# Patient Record
(demographics unavailable — no encounter records)

---

## 2024-12-19 NOTE — HISTORY OF PRESENT ILLNESS
[Mother] : mother [Fruit] : fruit [Vegetables] : vegetables [Meat] : meat [Grains] : grains [Eggs] : eggs [Fish] : fish [Dairy] : dairy [Normal] : Normal [In own bed] : In own bed [Brushing teeth] : Brushing teeth [Yes] : Patient goes to dentist yearly [Vitamin] : Primary Fluoride Source: Vitamin [Playtime (60 min/d)] : Playtime 60 min a day [< 2 hrs of screen time] : Less than 2 hrs of screen time [Appropiate parent-child-sibling interaction] : Appropriate parent-child-sibling interaction [Child Cooperates] : Child cooperates [Parent has appropriate responses to behavior] : Parent has appropriate responses to behavior [Grade ___] : Grade [unfilled] [No difficulties with Homework] : No difficulties with homework [Adequate performance] : Adequate performance [Adequate attention] : Adequate attention [No] : Not at  exposure [Water heater temperature set at <120 degrees F] : Water heater temperature set at <120 degrees F [Car seat in back seat] : Car seat in back seat [Carbon Monoxide Detectors] : Carbon monoxide detectors [Smoke Detectors] : Smoke detectors [Supervised outdoor play] : Supervised outdoor play [Up to date] : Up to date [NO] : No [___ stools per day] : [unfilled]  stools per day [Exposure to electronic nicotine delivery system] : No exposure to electronic nicotine delivery system [de-identified] : Little picky [FreeTextEntry3] : Sleeps through the night  [FreeTextEntry9] : Soccer- 2 seasons/Baseball [de-identified] : [employer] [place]

## 2024-12-19 NOTE — PHYSICAL EXAM
[Alert] : alert [No Acute Distress] : no acute distress [Normocephalic] : normocephalic [Conjunctivae with no discharge] : conjunctivae with no discharge [PERRL] : PERRL [EOMI Bilateral] : EOMI bilateral [Auricles Well Formed] : auricles well formed [Clear Tympanic membranes with present light reflex and bony landmarks] : clear tympanic membranes with present light reflex and bony landmarks [No Discharge] : no discharge [Nares Patent] : nares patent [Pink Nasal Mucosa] : pink nasal mucosa [Palate Intact] : palate intact [Nonerythematous Oropharynx] : nonerythematous oropharynx [Supple, full passive range of motion] : supple, full passive range of motion [No Palpable Masses] : no palpable masses [Symmetric Chest Rise] : symmetric chest rise [Clear to Auscultation Bilaterally] : clear to auscultation bilaterally [Regular Rate and Rhythm] : regular rate and rhythm [Normal S1, S2 present] : normal S1, S2 present [No Murmurs] : no murmurs [+2 Femoral Pulses] : +2 femoral pulses [Soft] : soft [NonTender] : non tender [Non Distended] : non distended [Normoactive Bowel Sounds] : normoactive bowel sounds [No Hepatomegaly] : no hepatomegaly [No Splenomegaly] : no splenomegaly [Eric: _____] : Eric [unfilled] [Uncircumcised] : uncircumcised [Testicles Descended Bilaterally] : testicles descended bilaterally [Patent] : patent [No fissures] : no fissures [No Abnormal Lymph Nodes Palpated] : no abnormal lymph nodes palpated [No Gait Asymmetry] : no gait asymmetry [No pain or deformities with palpation of bone, muscles, joints] : no pain or deformities with palpation of bone, muscles, joints [Normal Muscle Tone] : normal muscle tone [Straight] : straight [+2 Patella DTR] : +2 patella DTR [Cranial Nerves Grossly Intact] : cranial nerves grossly intact [No Rash or Lesions] : no rash or lesions

## 2024-12-19 NOTE — DISCUSSION/SUMMARY
[Normal Growth] : growth [Normal Development] : development [None] : No known medical problems [No Elimination Concerns] : elimination [No Feeding Concerns] : feeding [No Skin Concerns] : skin [Normal Sleep Pattern] : sleep [School Readiness] : school readiness [Mental Health] : mental health [Nutrition and Physical Activity] : nutrition and physical activity [Oral Health] : oral health [Safety] : safety [No Medications] : ~He/She~ is not on any medications [Patient] : patient [Full Activity without restrictions including Physical Education & Athletics] : Full Activity without restrictions including Physical Education & Athletics [I have examined the above-named student and completed the preparticipation physical evaluation. The athlete does not present apparent clinical contraindications to practice and participate in sport(s) as outlined above. A copy of the physical exam is on r] : I have examined the above-named student and completed the preparticipation physical evaluation. The athlete does not present apparent clinical contraindications to practice and participate in sport(s) as outlined above. A copy of the physical exam is on record in my office and can be made available to the school at the request of the parents. If conditions arise after the athlete has been cleared for participation, the physician may rescind the clearance until the problem is resolved and the potential consequences are completely explained to the athlete (and parents/guardians). [FreeTextEntry1] : 5 y/o M - Doing well Normal Exam Go Check vision screening- passed- d/w mother. Flu discussed/advised- mother does not want at this time. Form for blood work given. I recommended that the patient participates in 60 minutes or more of physical activity a day.  As a school-aged child, most physical activity will be unstructured; outdoor play is particularly helpful. Encouraged physical activity with playground time in addition to discouraging sedentary time (television use). Encouraged parents to consider physical activity levels when they make choices among options for  and after-school programs.   Next CP in 1 year.

## 2024-12-19 NOTE — DISCUSSION/SUMMARY
[Normal Growth] : growth [Normal Development] : development [None] : No known medical problems [No Elimination Concerns] : elimination [No Feeding Concerns] : feeding [No Skin Concerns] : skin [Normal Sleep Pattern] : sleep [School Readiness] : school readiness [Mental Health] : mental health [Nutrition and Physical Activity] : nutrition and physical activity [Oral Health] : oral health [Safety] : safety [No Medications] : ~He/She~ is not on any medications [Patient] : patient [Full Activity without restrictions including Physical Education & Athletics] : Full Activity without restrictions including Physical Education & Athletics [I have examined the above-named student and completed the preparticipation physical evaluation. The athlete does not present apparent clinical contraindications to practice and participate in sport(s) as outlined above. A copy of the physical exam is on r] : I have examined the above-named student and completed the preparticipation physical evaluation. The athlete does not present apparent clinical contraindications to practice and participate in sport(s) as outlined above. A copy of the physical exam is on record in my office and can be made available to the school at the request of the parents. If conditions arise after the athlete has been cleared for participation, the physician may rescind the clearance until the problem is resolved and the potential consequences are completely explained to the athlete (and parents/guardians). [FreeTextEntry1] : 7 y/o M - Doing well Normal Exam Go Check vision screening- passed- d/w mother. Flu discussed/advised- mother does not want at this time. Form for blood work given. I recommended that the patient participates in 60 minutes or more of physical activity a day.  As a school-aged child, most physical activity will be unstructured; outdoor play is particularly helpful. Encouraged physical activity with playground time in addition to discouraging sedentary time (television use). Encouraged parents to consider physical activity levels when they make choices among options for  and after-school programs.   Next CP in 1 year.

## 2024-12-19 NOTE — HISTORY OF PRESENT ILLNESS
[Mother] : mother [Fruit] : fruit [Vegetables] : vegetables [Meat] : meat [Grains] : grains [Eggs] : eggs [Fish] : fish [Dairy] : dairy [Normal] : Normal [In own bed] : In own bed [Brushing teeth] : Brushing teeth [Yes] : Patient goes to dentist yearly [Vitamin] : Primary Fluoride Source: Vitamin [Playtime (60 min/d)] : Playtime 60 min a day [< 2 hrs of screen time] : Less than 2 hrs of screen time [Appropiate parent-child-sibling interaction] : Appropriate parent-child-sibling interaction [Child Cooperates] : Child cooperates [Parent has appropriate responses to behavior] : Parent has appropriate responses to behavior [Grade ___] : Grade [unfilled] [No difficulties with Homework] : No difficulties with homework [Adequate performance] : Adequate performance [Adequate attention] : Adequate attention [No] : Not at  exposure [Water heater temperature set at <120 degrees F] : Water heater temperature set at <120 degrees F [Car seat in back seat] : Car seat in back seat [Carbon Monoxide Detectors] : Carbon monoxide detectors [Smoke Detectors] : Smoke detectors [Supervised outdoor play] : Supervised outdoor play [Up to date] : Up to date [NO] : No [___ stools per day] : [unfilled]  stools per day [Exposure to electronic nicotine delivery system] : No exposure to electronic nicotine delivery system [de-identified] : Little picky [FreeTextEntry3] : Sleeps through the night  [FreeTextEntry9] : Soccer- 2 seasons/Baseball [de-identified] : [employer] [place]

## 2025-04-28 NOTE — PHYSICAL EXAM
[Alert] : alert [EOMI] : grossly EOMI [Pink Nasal Mucosa] : pink nasal mucosa [Supple] : supple [Clear to Auscultation Bilaterally] : clear to auscultation bilaterally [Soft] : soft [Moves All Extremities x 4] : moves all extremities x4 [Normotonic] : normotonic [Acute Distress] : no acute distress [Erythematous Oropharynx] : nonerythematous oropharynx [de-identified] : Left side of head anterior to ear in hair line small area of excoriation- no pus or blood- no signs of any remnant of tick body- no sign of infection

## 2025-04-28 NOTE — HISTORY OF PRESENT ILLNESS
[de-identified] : Tick bite [FreeTextEntry6] : Allen got his hair cut yesterday and nothing was noted on him at that point. He proceeded to play outside and was rolling in the grass and going into bush area and about 2 hours later when they were at a soccer game, he C/O discomfort left anterior ear area in scalp line. Father saw tick that was on the bigger side and was alive and kicking. He removed tick and it did not appear engorged, and no blood was noted- just skin irritation at the area. Father was not sure he removed all of the tick and just wanted to have the area checked. Also, brought tick for Id. No fatigue/fever/joint pain, rashes, etc. No cold or coughing. No sore throat. No V/D/C/loose stools. NL sleep and appetite.

## 2025-04-28 NOTE — DISCUSSION/SUMMARY
[FreeTextEntry1] : 7 y/o M with Tick bite/Tick bite of head/Skin irritation- Mother brought tick in with her, and it appears to be larger than a deer tick, but we will get tick Id. I have reassured mother that since it was on much less than 24 hours and that it is larger- it is reassuring for illness. Advise mother to wash with antibacterial soap and place antibacterial ointment TID as needed. Tick sent for Id Ques addressed. Mother verbalizes understanding. Check back if symptoms do not improve or worsen or if any questions/concerns. Time spent patient/chart - 30 mins.